# Patient Record
(demographics unavailable — no encounter records)

---

## 2017-08-21 NOTE — PHYS DOC
Past Medical History


Past Medical History:  No Pertinent History


Past Surgical History:  No Surgical History


Alcohol Use:  None


Drug Use:  None





Adult General


Chief Complaint


Chief Complaint:  HAND PROBLEM





HPI


HPI





Patient is a 26  year old male who presents with moderate left hand pain, 

patient was working on a car today when the ivania came off the the car fell on 

his left hand.





Review of Systems


Review of Systems





Constitutional: Denies fever or chills []


Musculoskeletal:  left hand pain


Integument: Denies rash or skin lesions []


Neurologic: Denies headache, focal weakness or sensory changes []





Current Medications


Current Medications





Current Medications








 Medications


  (Trade)  Dose


 Ordered  Sig/Donna  Start Time


 Stop Time Status Last Admin


Dose Admin


 


 Acetaminophen/


 Hydrocodone Bitart


  (Lortab 5/325)  2 tab  1X  ONCE  8/21/17 19:00


 8/21/17 19:05 DC 8/21/17 19:08


2 TAB











Allergies


Allergies





Allergies








Coded Allergies Type Severity Reaction Last Updated Verified


 


  No Known Drug Allergies    6/7/15 No











Physical Exam


Physical Exam





Constitutional: Well developed, well nourished, no acute distress, non-toxic 

appearance. []


Skin: Warm, dry, no erythema, no rash. [] 


Back: No tenderness, no CVA tenderness. [] 


Extremities: Left hand with moderate soft tissue swelling. Tenderness diffusely 

on the left dorsal hand especially on the fourth and fifth metacarpals. Limited 

range of motion to the left hand due to pain. +2 left radial pulse. Cap refill 

less than 2 seconds the left fingers. Adequate radial medial and ulnar 

sensation to the left hand.


Neurologic: Alert and oriented X 3, normal motor function, normal sensory 

function, no focal deficits noted. []


Psychologic: Affect normal, judgement normal, mood normal. []





Current Patient Data


Vital Signs





 Vital Signs








  Date Time  Temp Pulse Resp B/P (MAP) Pulse Ox O2 Delivery O2 Flow Rate FiO2


 


8/21/17 19:08   18   Room Air  


 


8/21/17 18:54 99.1 98   100   





 99.1       











EKG


EKG


[]





Radiology/Procedures


Radiology/Procedures


[]





Course & Med Decision Making


Course & Med Decision Making


Pertinent Labs and Imaging studies reviewed. (See chart for details)





Patient is in the ED left hand pain after a car  he was working on fell on his 

left hand. Left hand x-rays interpreted by Dr. Aguiar were noted for fifth 

metacarpal avulsion fracture.


Patient was placed in ulnar gutter splint by the Ed tech, neurovascular exam 

done by me is normal, cap refill less than 2 seconds.


Patient is to follow-up with orthopedic doctor provided by calling the office 

tomorrow. Ice elevation encouraged.





Dragon Disclaimer


Dragon Disclaimer


This electronic medical record was generated, in whole or in part, using a 

voice recognition dictation system.





Departure


Departure


Impression:  


 Primary Impression:  


 Avulsion fracture


Disposition:  01 HOME, SELF-CARE


Condition:  STABLE


Referrals:  


NO PCP (PCP)








DEJAH ESCALONA MD


call his office tomorrow and set up a follow up appointment


Patient Instructions:  Avulsion Fracture





Additional Instructions:  


You seen for left hand fracture. Follow-up with the provided orthopedic doctor 

by calling his office tomorrow morning for follow-up appointment.


Scripts


Hydrocodone/Apap 5-325 (NORCO 5-325 TABLET) 1 Each Tablet


1-2 TAB PO Q4-6HRS, #20 TAB


   Prov: JESSICA NICKERSON         8/21/17











JESSICA NICKERSON Aug 21, 2017 18:59

## 2017-08-22 NOTE — RAD
Exam performed: 3 views left hand.



History: Left hand pain status post injury.



Date of service: 08/21/17. Comparison: X-ray left hand from 04/02/12.



3 views left hand findings:



There is a small ossific fragment in relation to the base of fifth metatarsal

with overlying soft tissue swelling. Note is made that this was present on the

previous x-ray hand. The remainder alignment appears preserved. No additional

acute displaced fracture is noted. There is diffuse soft tissue swelling.



Impression:



Ossific fragments in relation to the base of fifth metatarsal is

redemonstrated and probably represents a nonacute avulsion.



No acute osseous abnormality noted.